# Patient Record
Sex: MALE | Race: WHITE | Employment: FULL TIME | URBAN - METROPOLITAN AREA
[De-identification: names, ages, dates, MRNs, and addresses within clinical notes are randomized per-mention and may not be internally consistent; named-entity substitution may affect disease eponyms.]

---

## 2018-03-29 ENCOUNTER — HOSPITAL ENCOUNTER (OUTPATIENT)
Dept: LAB | Facility: CLINIC | Age: 38
Discharge: HOME | End: 2018-03-29
Attending: INTERNAL MEDICINE | Admitting: INTERNAL MEDICINE
Payer: COMMERCIAL

## 2018-03-29 ENCOUNTER — OFFICE VISIT (OUTPATIENT)
Dept: INTERNAL MEDICINE | Facility: CLINIC | Age: 38
End: 2018-03-29
Payer: COMMERCIAL

## 2018-03-29 VITALS
DIASTOLIC BLOOD PRESSURE: 68 MMHG | HEART RATE: 64 BPM | WEIGHT: 155.4 LBS | SYSTOLIC BLOOD PRESSURE: 108 MMHG | TEMPERATURE: 98.5 F | HEIGHT: 68 IN | BODY MASS INDEX: 23.55 KG/M2 | RESPIRATION RATE: 16 BRPM | OXYGEN SATURATION: 96 %

## 2018-03-29 DIAGNOSIS — Z00.00 ROUTINE GENERAL MEDICAL EXAMINATION AT A HEALTH CARE FACILITY: Primary | ICD-10-CM

## 2018-03-29 DIAGNOSIS — H61.21 IMPACTED CERUMEN OF RIGHT EAR: ICD-10-CM

## 2018-03-29 DIAGNOSIS — H93.8X3 SENSATION OF FULLNESS IN BOTH EARS: ICD-10-CM

## 2018-03-29 DIAGNOSIS — Z00.00 ROUTINE GENERAL MEDICAL EXAMINATION AT A HEALTH CARE FACILITY: ICD-10-CM

## 2018-03-29 DIAGNOSIS — Z23 NEED FOR DIPHTHERIA-TETANUS-PERTUSSIS (TDAP) VACCINE: ICD-10-CM

## 2018-03-29 DIAGNOSIS — Z71.89 ADVANCE DIRECTIVE DISCUSSED WITH PATIENT: ICD-10-CM

## 2018-03-29 LAB
ALBUMIN SERPL-MCNC: 3.8 G/DL (ref 3.4–5)
ALBUMIN SERPL-MCNC: 3.8 G/DL (ref 3.4–5)
ALP SERPL-CCNC: 63 U/L (ref 40–150)
ALP SERPL-CCNC: 63 U/L (ref 40–150)
ALT SERPL W P-5'-P-CCNC: 28 U/L (ref 0–70)
ALT SERPL W P-5'-P-CCNC: 28 U/L (ref 0–70)
ANION GAP SERPL CALCULATED.3IONS-SCNC: 5 MMOL/L (ref 3–14)
ANION GAP SERPL CALCULATED.3IONS-SCNC: 5 MMOL/L (ref 6–17)
AST SERPL W P-5'-P-CCNC: 17 U/L (ref 0–45)
AST SERPL W P-5'-P-CCNC: 17 U/L (ref 0–45)
BILIRUB SERPL-MCNC: 0.3 MG/DL (ref 0.2–1.3)
BILIRUB SERPL-MCNC: 0.3 MG/DL (ref 0.2–1.3)
BUN SERPL-MCNC: 11 MG/DL (ref 7–30)
BUN SERPL-MCNC: 11 MG/DL (ref 7–30)
CALCIUM SERPL-MCNC: 9.2 MG/DL (ref 8.5–10.1)
CALCIUM SERPL-MCNC: 9.2 MG/DL (ref 8.5–10.1)
CHLORIDE SERPL-SCNC: 103 MMOL/L (ref 94–109)
CHLORIDE SERPL-SCNC: 103 MMOL/L (ref 94–109)
CHOLEST SERPL-MCNC: 134 MG/DL
CHOLEST SERPL-MCNC: 134 MG/DL
CO2 SERPL-SCNC: 31 MMOL/L (ref 20–32)
CO2 SERPL-SCNC: 31 MMOL/L (ref 20–32)
CREAT SERPL-MCNC: 0.98 MG/DL (ref 0.66–1.25)
CREAT SERPL-MCNC: 0.98 MG/DL (ref 0.66–1.25)
ERYTHROCYTE [DISTWIDTH] IN BLOOD BY AUTOMATED COUNT: 11.6 % (ref 10–15)
ERYTHROCYTE [DISTWIDTH] IN BLOOD BY AUTOMATED COUNT: 11.6 % (ref 10–15)
GFR SERPL CREATININE-BSD FRML MDRD: 87 ML/MIN/1.7M2
GFR SERPL CREATININE-BSD FRML MDRD: 87 ML/MIN/1.7M2
GLUCOSE SERPL-MCNC: 91 MG/DL (ref 70–99)
GLUCOSE SERPL-MCNC: 91 MG/DL (ref 70–99)
HCT VFR BLD AUTO: 42.7 % (ref 40–53)
HCT VFR BLD AUTO: 42.7 % (ref 40–53)
HDLC SERPL-MCNC: 53 MG/DL
HDLC SERPL-MCNC: 53 MG/DL
HGB BLD-MCNC: 14.7 G/DL (ref 13.3–17.7)
HGB BLD-MCNC: 14.7 G/DL (ref 13.3–17.7)
LDLC SERPL CALC-MCNC: 71 MG/DL
LDLC SERPL CALC-MCNC: 71 MG/DL (ref 0–130)
MCH RBC QN AUTO: 29.9 PG (ref 26.5–33)
MCH RBC QN AUTO: 29.9 PG (ref 26.5–33)
MCHC RBC AUTO-ENTMCNC: 34.4 G/DL (ref 31.5–36.5)
MCHC RBC AUTO-ENTMCNC: 34.4 G/DL (ref 31.5–36.5)
MCV RBC AUTO: 87 FL (ref 78–100)
MCV RBC AUTO: 87 FL (ref 78–100)
NONHDLC SERPL-MCNC: 81 MG/DL
NONHDLC SERPL-MCNC: 81 MG/DL
PLATELET # BLD AUTO: 260 10E9/L (ref 150–450)
PLATELET # BLD AUTO: 260 10E9/L (ref 150–450)
POTASSIUM SERPL-SCNC: 4 MMOL/L (ref 3.4–5.3)
POTASSIUM SERPL-SCNC: 4 MMOL/L (ref 3.4–5.3)
PROT SERPL-MCNC: 7.8 G/DL (ref 6.8–8.8)
PROT SERPL-MCNC: 7.8 G/DL (ref 6.8–8.8)
RBC # BLD AUTO: 4.92 10E12/L (ref 4.4–5.9)
RBC # BLD AUTO: 4.92 10E12/L (ref 4.4–5.9)
SODIUM SERPL-SCNC: 139 MMOL/L (ref 133–144)
SODIUM SERPL-SCNC: 139 MMOL/L (ref 133–144)
TRIGL SERPL-MCNC: 51 MG/DL
TRIGL SERPL-MCNC: 51 MG/DL
TSH SERPL DL<=0.005 MIU/L-ACNC: 2.23 MU/L (ref 0.4–4)
TSH SERPL DL<=0.005 MIU/L-ACNC: 2.23 MU/L (ref 0.4–4)
WBC # BLD AUTO: 8.7 10E9/L (ref 4–11)
WBC # BLD AUTO: 8.7 10E9/L (ref 4–11)

## 2018-03-29 PROCEDURE — 84443 ASSAY THYROID STIM HORMONE: CPT | Performed by: INTERNAL MEDICINE

## 2018-03-29 PROCEDURE — 69210 REMOVE IMPACTED EAR WAX UNI: CPT | Mod: RT | Performed by: INTERNAL MEDICINE

## 2018-03-29 PROCEDURE — 80061 LIPID PANEL: CPT | Performed by: INTERNAL MEDICINE

## 2018-03-29 PROCEDURE — 80053 COMPREHEN METABOLIC PANEL: CPT | Performed by: INTERNAL MEDICINE

## 2018-03-29 PROCEDURE — 85027 COMPLETE CBC AUTOMATED: CPT | Performed by: INTERNAL MEDICINE

## 2018-03-29 PROCEDURE — 99385 PREV VISIT NEW AGE 18-39: CPT | Mod: 25 | Performed by: INTERNAL MEDICINE

## 2018-03-29 PROCEDURE — 90471 IMMUNIZATION ADMIN: CPT | Performed by: INTERNAL MEDICINE

## 2018-03-29 PROCEDURE — 90715 TDAP VACCINE 7 YRS/> IM: CPT | Performed by: INTERNAL MEDICINE

## 2018-03-29 PROCEDURE — 36415 COLL VENOUS BLD VENIPUNCTURE: CPT | Performed by: INTERNAL MEDICINE

## 2018-03-29 NOTE — PROGRESS NOTES
Dr Gaffney's note    Patient's instructions / PLAN:                                                        Plan:  1. Labs today   2. Flonase nasal spray daily - if still plugged ears  3. ENT referral if not better        ASSESSMENT & PLAN:                                                      (Z00.00) Routine general medical examination at a health care facility  (primary encounter diagnosis)  Comment:   Plan: Comprehensive metabolic panel, Lipid panel         reflex to direct LDL Fasting, TSH with free T4         reflex, CBC with platelets            (H61.21) Impacted cerumen of right ear  Comment:   Plan: as above     (H93.8X3) Sensation of fullness in both ears  Comment:   Plan: OTOLARYNGOLOGY REFERRAL            (Z71.89) Advance directive discussed with patient  Comment:   Plan: forms given    (Z23) Need for diphtheria-tetanus-pertussis (Tdap) vaccine  Comment:   Plan: TDAP VACCINE (ADACEL)               Chief Complaint:                                                      Annual exam    SUBJECTIVE:                                                    History of present illness     Both ears: when he works out or moving he feels he has water in both ears. More the R ear. This is been going on on/off for months     R ear impacted wax no wash    Exam The auditory external canal on the R has impacted wax.   The patient gave me verbal consent and I remove it allt with a curet.  No complications         ROS:   General: Negative for fever, chills, major weight changes, fatigue  Skin: Negative for rashes, abnormal spots  Eyes: Negative for blurred or double vision  ENT/mouth: Negative for sinuses discomfort, sore throat. Pos for ear symptoms, as above   Respiratory: Negative for cough, wheezes, chronic lung disease  Cardiovascular: Negative for rest or exertional chest pain, shortness of breath, palpitations, leg edema,   Gastrointestinal: Negative for vomiting, abdominal pain, heartburn, blood in stool, diarrhea,  "constipation  Genitourinary: Negative for urinary frequency, blood in urine, history of kidney stones  Male: Negative for difficulty urinating  Neuro: Negative for headaches, numbness, tingling, weakness in arms or legs, history of seizure, recent syncope  Psychiatry: Negative for depression, anxiety, suicidal thoughts  Endo: Negative for known thyroid disease, diabetes.  Hemato/Lymph: Negative for nodes, easy bleeding, history of DVT, blood transfusion  Musculoskeletal: Negative for joint swelling, back pain      PMHx: - reviewed  History reviewed. No pertinent past medical history.      PSHx: reviewed  History reviewed. No pertinent surgical history.     Soc Hx: No daily alcohol, no smoking  Social History     Social History     Marital status:      Spouse name: N/A     Number of children: N/A     Years of education: N/A     Occupational History     Not on file.     Social History Main Topics     Smoking status: Never Smoker     Smokeless tobacco: Never Used     Alcohol use Yes     Drug use: No     Sexual activity: Yes     Partners: Female     Other Topics Concern     Not on file     Social History Narrative     No narrative on file        Fam Hx: reviewed  Family History   Problem Relation Age of Onset     No Known Problems Mother      No Known Problems Father      Other Cancer Maternal Grandmother      Brain Cancer Paternal Grandmother      Lung Cancer Paternal Grandfather 87     NON-SMOKER     DIABETES No family hx of          Screening: reviewed    All: reviewed    Meds: reviewed  No current outpatient prescriptions on file.           OBJECTIVE:                                                    Physical Exam :      Blood pressure 108/68, pulse 64, temperature 98.5  F (36.9  C), temperature source Oral, resp. rate 16, height 5' 7.5\" (1.715 m), weight 155 lb 6.4 oz (70.5 kg), SpO2 96 %.   NAD, appears comfortable  Skin clear, no rashes  HEENT: PERRLA, EOMI, anicteric sclera, pink conjunctiva, external " ears appear normal, bilateral tympanic membranes clinically normal, oropharynx normal color.  Neck: supple, no JVD, no Carotid bruits, no thyroidmegaly  Lymph nodes non palpable in the cervical, supraclavicular axillaries, inguinal areas  Chest: clear to auscultation with good respiratory effort  Cardiac: S1S2, RRR, no mgr appreciated  Abdomen: soft, not tender, not distended, audible bowel sound, no hepatosplenomegaly, no palpable masses, no abdominal bruits  Extremities: no cyanosis, clubbing or edema.   Neuro: A, Ox3, no focal signs.  Breast exam no gynecomastia, no masses      Laurence Gaffney MD  Internal Medicine     SUBJECTIVE:   CC: Todd Verma is an 38 year old male who presents for preventative health visit.     Fasting.    Physical   Annual:     Getting at least 3 servings of Calcium per day::  Yes    Bi-annual eye exam::  NO    Dental care twice a year::  Yes    Sleep apnea or symptoms of sleep apnea::  None    Diet::  Regular (no restrictions)    Frequency of exercise::  2-3 days/week    Duration of exercise::  45-60 minutes    Taking medications regularly::  Yes    Medication side effects::  None    Additional concerns today::  YES          Ear Problem         Today's PHQ-2 Score:   PHQ-2 ( 1999 Pfizer) 3/29/2018   Q1: Little interest or pleasure in doing things 0   Q2: Feeling down, depressed or hopeless 0   PHQ-2 Score 0   Q1: Little interest or pleasure in doing things Not at all   Q2: Feeling down, depressed or hopeless Not at all   PHQ-2 Score 0       Abuse: Current or Past(Physical, Sexual or Emotional)- No  Do you feel safe in your environment - Yes    Social History   Substance Use Topics     Smoking status: Never Smoker     Smokeless tobacco: Never Used     Alcohol use Yes     Alcohol Use 3/29/2018   If you drink alcohol do you typically have greater than 3 drinks per day OR greater than 7 drinks per week? No   No flowsheet data found.    Last PSA: No results found for: PSA    Reviewed  "orders with patient. Reviewed health maintenance and updated orders accordingly - Yes      Reviewed and updated as needed this visit by clinical staff  Tobacco  Allergies  Meds  Med Hx  Surg Hx  Fam Hx  Soc Hx        Reviewed and updated as needed this visit by Provider            Review of Systems   HENT: Positive for ear pain.      Physical Exam        COUNSELING:   Reviewed preventive health counseling, as reflected in patient instructions       Regular exercise       Healthy diet/nutrition         reports that he has never smoked. He has never used smokeless tobacco.    Estimated body mass index is 23.98 kg/(m^2) as calculated from the following:    Height as of this encounter: 5' 7.5\" (1.715 m).    Weight as of this encounter: 155 lb 6.4 oz (70.5 kg).       Counseling Resources:  ATP IV Guidelines  Pooled Cohorts Equation Calculator  FRAX Risk Assessment  ICSI Preventive Guidelines  Dietary Guidelines for Americans, 2010  USDA's MyPlate  ASA Prophylaxis  Lung CA Screening    Laurence Braswell MD  [facility]  Answers for HPI/ROS submitted by the patient on 3/29/2018   PHQ-2 Score: 0    "

## 2018-03-29 NOTE — MR AVS SNAPSHOT
"              After Visit Summary   3/29/2018    Todd Verma    MRN: [id number]           Patient Information     Date Of Birth          1980        Visit Information        Provider Department      3/29/2018 8:00 AM Laurence Braswell MD [facility] [address] [facility]        Today's Diagnoses     Routine general medical examination at a health care facility    -  1    Impacted cerumen of right ear        Sensation of fullness in both ears          Care Instructions    Plan:  1. Labs today   2. Flonase nasal spray daily - if still plugged ears  3. ENT referral if not better    I recommend to consider attending the free classes regarding Health Directives. They are offered by [facility] at different locations.   Contact Information: [facility] [place]    Phone: [phone], Fax: [phone]  [email or web address]             HEALTH INSURANCE AND YOUR OUT-OF-POCKET COSTS    How is the physical visit different from an office visit ?    A physical visit is a routine check-up or yearly physical exam. This is sometimes called \"preventive care\".  ( for example, you might have a clinic exam every year or a mammogram every other year). These visits are meant to prevent health problems. They do not include tests or treatments for specific medical issues.     An office visit is a clinic visit to check on a symptoms or to treat a specific concern. This concern may be new or ongoing. Your provider (care team) might order tests or prescribe treatments.     Can I have these services at the same time ?    Yes. If you come in for a physical exam, your provider will want to  talk about any symptoms you are having. This way, we may catch small problems before they become more serious - and you won't have to make another trip to the clinic.     If there is not enough time to talk about your symptoms, your provider will ask you to come back.    If you are treated for a medical issue during a physical exam, we must " "bill your plan for both services. This is a rule set by insurance companies.     If I receive both services, what are my out-of-pocket costs ?    Some plans will pay for both services. Others ( like Medicare) will not. You will need to pay for any service that your plan won't cover.     Even if your plan covers both services, you may still have out-of-pocket costs. Examples:    -- your plan may offer free physical exams. But you may owe a co-pay and other fees for services received as part of an office visit.   -- you plan may require two co-pays. If you have concerns about the second co-pay, please contact your insurance plan.    To find out what your total costs will be, you will need to call your insurance plan. Ask:    -- what does my plan cover ? Find out if your physical visit was covered. What if you also had testing or treatment for a medical concern ?    -- how much do I need to pay ? Ask about co-pays, co-insurance, your deductible and any other out-of-pocket costs.     -- are there limits on what my plan will pay for ? There may be limits on office visits, physical exams and routine tests ( such mammograms, PSA tests and colonoscopies).    About Your Out-Of-Pocket Costs    Out-of-Pocket costs are charges that are not covered by your insurance plan. You will need to pay for them yourself. They may include:    -- Services that your plan will not pay for. Please call your insurance plan to find out what it will cover.     -- A deductible. This is a fixed amount that you pay each year before insurance will pay for services. When you have paid the full amount, then you have \"met\" your deductible. After that, your plan will pay for part or all of your care.     -- Co-pays (co-payments). A co-pay is the amount you must pay at the time of service. It is a flat fee, decided by your insurance plan. Your fee may be different for wellness visits and office visits. Your plan will not cover this fee. The fee will not " count toward your deductible.    -- Co-insurance. You may need to pay a percent of the costs for all services you receive. This is called co-insurance. After your clinic visit, your plan will bill you for your share of the cost. This amount may count toward your deductible.     Copyright @ [facility]. All rights reserved. Jeremie 69958 - REV 11/12  -------------              Follow-ups after your visit        Additional Services     OTOLARYNGOLOGY REFERRAL       Your provider has referred you to:     N: [facility] [place] [phone]   [email or web address]    N: [facility] [phone]   [email or web address]  N: [facility] [phone]   [email or web address]  N: [place] Katelyn Fallon  [facility]  Katelyn Fallon [phone]   [email or web address]  N: [facility] [phone]   [email or web address]  N: [place] [facility] [phone]   [email or web address]  N: [facility] [phone]   [email or web address]    Please be aware that coverage of these services is subject to the terms and limitations of your health insurance plan.  Call member services at your health plan with any benefit or coverage questions.      Please bring the following to your appointment:  >>   Any x-rays, CTs or MRIs which have been performed.  Contact the facility where they were done to arrange for  prior to your scheduled appointment.  Any new CT, MRI or other procedures ordered by your specialist must be performed at a [facility] or coordinated by your clinic's referral office.    >>   List of current medications   >>   This referral request   >>   Any documents/labs given to you for this  "referral                  Who to contact     If you have questions or need follow up information about today's clinic visit or your schedule please contact [facility] directly at [phone].  Normal or non-critical lab and imaging results will be communicated to you by MyChart, letter or phone within 4 business days after the clinic has received the results. If you do not hear from us within 7 days, please contact the clinic through MyChart or phone. If you have a critical or abnormal lab result, we will notify you by phone as soon as possible.  Submit refill requests through [employer] or call your pharmacy and they will forward the refill request to us. Please allow 3 business days for your refill to be completed.          Additional Information About Your Visit        Care EveryWhere ID     This is your Care EveryWhere ID. This could be used by other organizations to access your [place] medical records  [id number]        Your Vitals Were     Pulse Temperature Respirations Height Pulse Oximetry BMI (Body Mass Index)    64 98.5  F (36.9  C) (Oral) 16 5' 7.5\" (1.715 m) 96% 23.98 kg/m2       Blood Pressure from Last 3 Encounters:   03/29/18 108/68    Weight from Last 3 Encounters:   03/29/18 155 lb 6.4 oz (70.5 kg)              We Performed the Following     CBC with platelets     Comprehensive metabolic panel     Lipid panel reflex to direct LDL Fasting     OTOLARYNGOLOGY REFERRAL     TSH with free T4 reflex        Primary Care Provider Office Phone # Fax #    Laurence Braswell [phone]       [address] [facility] [address]        Equal Access to Services     [facility] LADONNA : Hadii aad ku hadasho Soomaali, waaxda luqadaha, qaybta kaalmada adeegyateena, royce bonner. So [facility] [phone].    ATENCIÓN: Si habla español, tiene a hitchcock disposición servicios gratuitos de asistencia lingüística. Llame al [phone].    We comply with " applicable federal civil rights laws and [place] laws. We do not discriminate on the basis of race, color, national origin, age, disability, sex, sexual orientation, or gender identity.            Thank you!     Thank you for choosing [facility]  for your care. Our goal is always to provide you with excellent care. Hearing back from our patients is one way we can continue to improve our services. Please take a few minutes to complete the written survey that you may receive in the mail after your visit with us. Thank you!             Your Updated Medication List - Protect others around you: Learn how to safely use, store and throw away your medicines at www.disposemymeds.org.      Notice  As of 3/29/2018  8:30 AM    You have not been prescribed any medications.

## 2018-03-29 NOTE — NURSING NOTE
"Chief Complaint   Patient presents with     Establish Care     Physical     Ear Problem       Initial /68 (BP Location: Right arm, Patient Position: Chair, Cuff Size: Adult Large)  Pulse 64  Temp 98.5  F (36.9  C) (Oral)  Resp 16  Ht 5' 7.5\" (1.715 m)  Wt 155 lb 6.4 oz (70.5 kg)  SpO2 96%  BMI 23.98 kg/m2 Estimated body mass index is 23.98 kg/(m^2) as calculated from the following:    Height as of this encounter: 5' 7.5\" (1.715 m).    Weight as of this encounter: 155 lb 6.4 oz (70.5 kg).  Medication Reconciliation: complete    "

## 2018-03-29 NOTE — NURSING NOTE
Screening Questionnaire for Adult Immunization    Are you sick today?   No   Do you have allergies to medications, food, a vaccine component or latex?   No   Have you ever had a serious reaction after receiving a vaccination?   No   Do you have a long-term health problem with heart disease, lung disease, asthma, kidney disease, metabolic disease (e.g. diabetes), anemia, or other blood disorder?   No   Do you have cancer, leukemia, HIV/AIDS, or any other immune system problem?   No   In the past 3 months, have you taken medications that affect  your immune system, such as prednisone, other steroids, or anticancer drugs; drugs for the treatment of rheumatoid arthritis, Crohn s disease, or psoriasis; or have you had radiation treatments?   No   Have you had a seizure, or a brain or other nervous system problem?   No   During the past year, have you received a transfusion of blood or blood     products, or been given immune (gamma) globulin or antiviral drug?   No   For women: Are you pregnant or is there a chance you could become        pregnant during the next month?   No   Have you received any vaccinations in the past 4 weeks?   No     Immunization questionnaire answers were all negative.        Per orders of Dr. Gaffney, injection of Tdap given by Jennifer Salmon. Patient instructed to remain in clinic for 15 minutes afterwards, and to report any adverse reaction to me immediately.       Screening performed by Jennifer Salmon on 3/29/2018 at 8:41 AM.

## 2018-03-29 NOTE — LETTER
[facility]  [address], [address]  [place] [address]  [phone]        March 29, 2018    Todd Verma  [address] [place] [address]            Dear Mr. Todd Verma:      The recent blood tests results are in acceptable limits.    Sincerely,    Laurence Gaffney MD  Internal Medicine    These are some general explanations for tests  WBC means White Blood Cells  Platelets are small blood cells that help with forming the blood clots along with other blood factors.  Electrolytes are Sodium, Potassium, Calcium, Magnesium, Phosphorus.  Liver tests are: AST, ALT, Bilirubin, Alkaline Phosphatase.  Kidney tests are Creatinine, GFR.  HDL Cholesterol - is the good cholesterol and it is good to have it high.  LDL cholesterol is the bad cholesterol and it is good to have it low.  It is recommended to have LDL less than 130 for people with hypertension and to have it less than 100 for people with heart disease, diabetes and chronic kidney disease.  Thyroid tests are TSH, T4, T3  A1c is a test that gives us an idea about how well was controlled the diabetes for the last 3 months.

## 2020-11-03 ENCOUNTER — OFFICE VISIT (OUTPATIENT)
Dept: INTERNAL MEDICINE | Facility: CLINIC | Age: 40
End: 2020-11-03
Payer: COMMERCIAL

## 2020-11-03 VITALS
HEART RATE: 62 BPM | RESPIRATION RATE: 16 BRPM | HEIGHT: 68 IN | SYSTOLIC BLOOD PRESSURE: 98 MMHG | WEIGHT: 164 LBS | TEMPERATURE: 98.4 F | BODY MASS INDEX: 24.86 KG/M2 | DIASTOLIC BLOOD PRESSURE: 60 MMHG | OXYGEN SATURATION: 99 %

## 2020-11-03 DIAGNOSIS — H92.01 RIGHT EAR PAIN: Primary | ICD-10-CM

## 2020-11-03 DIAGNOSIS — H65.01 RIGHT ACUTE SEROUS OTITIS MEDIA, RECURRENCE NOT SPECIFIED: ICD-10-CM

## 2020-11-03 PROCEDURE — 99213 OFFICE O/P EST LOW 20 MIN: CPT | Performed by: NURSE PRACTITIONER

## 2020-11-03 RX ORDER — CEFDINIR 300 MG/1
300 CAPSULE ORAL 2 TIMES DAILY
Qty: 20 CAPSULE | Refills: 0 | Status: SHIPPED | OUTPATIENT
Start: 2020-11-03 | End: 2020-11-20

## 2020-11-03 ASSESSMENT — MIFFLIN-ST. JEOR: SCORE: 1630.46

## 2020-11-03 NOTE — NURSING NOTE
"Chief Complaint   Patient presents with     Plugged Ears     rt ear is plugged had same thing x 2 years ago.     initial BP 98/60   Pulse 62   Temp 98.4  F (36.9  C) (Oral)   Resp 16   Ht 1.715 m (5' 7.5\")   Wt 74.4 kg (164 lb)   SpO2 99%   BMI 25.31 kg/m   Estimated body mass index is 25.31 kg/m  as calculated from the following:    Height as of this encounter: 1.715 m (5' 7.5\").    Weight as of this encounter: 74.4 kg (164 lb)..  bp completed using cuff size regular  JULI FERNÁNDEZ LPN  "

## 2020-11-03 NOTE — PROGRESS NOTES
"Subjective     Todd Verma is a 40 year old male who presents to clinic today for the following health issues:    HPI       Chief Complaint   Patient presents with     Plugged Ears     rt ear is plugged had same thing x 2 years ago.              Review of Systems   Constitutional, HEENT, cardiovascular, pulmonary, GI, , musculoskeletal, neuro, skin, endocrine and psych systems are negative, except as otherwise noted.      Objective    BP 98/60   Pulse 62   Temp 98.4  F (36.9  C) (Oral)   Resp 16   Ht 1.715 m (5' 7.5\")   Wt 74.4 kg (164 lb)   SpO2 99%   BMI 25.31 kg/m    Body mass index is 25.31 kg/m .  Physical Exam   GENERAL: alert and no distress  HENT: ear canals and TM's normal left right TM with area of pink - no cerumen present , nose and mouth without ulcers or lesions  RESP: lungs clear to auscultation - no rales, rhonchi or wheezes  CV: regular rate and rhythm  PSYCH: mentation appears normal, affect normal/bright    Plans on physical in a week or so         Assessment & Plan     Right ear pain    - cefdinir (OMNICEF) 300 MG capsule; Take 1 capsule (300 mg) by mouth 2 times daily    Right acute serous otitis media, recurrence not specified    - cefdinir (OMNICEF) 300 MG capsule; Take 1 capsule (300 mg) by mouth 2 times daily     BMI:   Estimated body mass index is 25.31 kg/m  as calculated from the following:    Height as of this encounter: 1.715 m (5' 7.5\").    Weight as of this encounter: 74.4 kg (164 lb).            Patient Instructions   OMNICEF TWICE DAILY FOR 10 DAYS     MAY TAKE CLARITIN DAILY        Return in about 9 days (around 11/12/2020) for Routine preventive.    GAY Murray [facility]    "

## 2020-11-20 ENCOUNTER — OFFICE VISIT (OUTPATIENT)
Dept: INTERNAL MEDICINE | Facility: CLINIC | Age: 40
End: 2020-11-20
Payer: COMMERCIAL

## 2020-11-20 VITALS
RESPIRATION RATE: 16 BRPM | BODY MASS INDEX: 24.86 KG/M2 | DIASTOLIC BLOOD PRESSURE: 71 MMHG | HEART RATE: 53 BPM | HEIGHT: 68 IN | SYSTOLIC BLOOD PRESSURE: 115 MMHG | WEIGHT: 164 LBS

## 2020-11-20 DIAGNOSIS — Z00.00 ROUTINE GENERAL MEDICAL EXAMINATION AT A HEALTH CARE FACILITY: Primary | ICD-10-CM

## 2020-11-20 PROCEDURE — 99395 PREV VISIT EST AGE 18-39: CPT | Performed by: NURSE PRACTITIONER

## 2020-11-20 ASSESSMENT — ENCOUNTER SYMPTOMS
DIARRHEA: 0
NERVOUS/ANXIOUS: 0
HEMATOCHEZIA: 0
WEAKNESS: 0
CHILLS: 0
SHORTNESS OF BREATH: 0
CONSTIPATION: 0
ABDOMINAL PAIN: 0
HEARTBURN: 0
FREQUENCY: 0
PARESTHESIAS: 0
NAUSEA: 0
PALPITATIONS: 0
HEADACHES: 0
FEVER: 0
DYSURIA: 0
MYALGIAS: 0
ARTHRALGIAS: 0
HEMATURIA: 0
EYE PAIN: 0
JOINT SWELLING: 0
SORE THROAT: 0
DIZZINESS: 0
COUGH: 0

## 2020-11-20 ASSESSMENT — MIFFLIN-ST. JEOR: SCORE: 1630.46

## 2020-11-20 NOTE — PROGRESS NOTES
SUBJECTIVE:   CC: Todd Verma is an 40 year old male who presents for preventative health visit.       Patient has been advised of split billing requirements and indicates understanding: Yes  Healthy Habits:     Getting at least 3 servings of Calcium per day:  Yes    Bi-annual eye exam:  Yes    Dental care twice a year:  Yes    Sleep apnea or symptoms of sleep apnea:  None    Diet:  Regular (no restrictions)    Frequency of exercise:  4-5 days/week    Duration of exercise:  15-30 minutes    Taking medications regularly:  Yes    Medication side effects:  None    PHQ-2 Total Score: 0    Additional concerns today:  No    Ability to successfully perform activities of daily living: Yes, no assistance needed  Home safety:  none identified   Hearing impairment:No        Today's PHQ-2 Score:   PHQ-2 ( 1999 Pfizer) 11/20/2020   Q1: Little interest or pleasure in doing things 0   Q2: Feeling down, depressed or hopeless 0   PHQ-2 Score 0   Q1: Little interest or pleasure in doing things Not at all   Q2: Feeling down, depressed or hopeless Not at all   PHQ-2 Score 0       Abuse: Current or Past(Physical, Sexual or Emotional)- No  Do you feel safe in your environment? Yes        Social History     Tobacco Use     Smoking status: Never Smoker     Smokeless tobacco: Never Used   Substance Use Topics     Alcohol use: Yes       Alcohol Use 11/20/2020   Prescreen: >3 drinks/day or >7 drinks/week? No   Prescreen: >3 drinks/day or >7 drinks/week? -       Last PSA: No results found for: PSA    Reviewed orders with patient. Reviewed health maintenance and updated orders accordingly -       Reviewed and updated as needed this visit by clinical staff  Tobacco  Allergies  Meds  Problems  Med Hx  Surg Hx  Fam Hx          Reviewed and updated as needed this visit by Provider  Tobacco  Allergies  Meds  Problems  Med Hx  Surg Hx  Fam Hx             Review of Systems   Constitutional: Negative for chills and fever.  "  HENT: Negative for congestion, ear pain, hearing loss and sore throat.    Eyes: Negative for pain and visual disturbance.   Respiratory: Negative for cough and shortness of breath.    Cardiovascular: Negative for chest pain, palpitations and peripheral edema.   Gastrointestinal: Negative for abdominal pain, constipation, diarrhea, heartburn, hematochezia and nausea.   Genitourinary: Negative for discharge, dysuria, frequency, genital sores, hematuria, impotence and urgency.   Musculoskeletal: Negative for arthralgias, joint swelling and myalgias.   Skin: Negative for rash.   Neurological: Negative for dizziness, weakness, headaches and paresthesias.   Psychiatric/Behavioral: Negative for mood changes. The patient is not nervous/anxious.      Right ear pain - treated and improved      OBJECTIVE:   /71   Pulse 53   Resp 16   Ht 1.715 m (5' 7.5\")   Wt 74.4 kg (164 lb)   BMI 25.31 kg/m      Physical Exam  GENERAL: healthy, alert and no distress  EYES: Eyes grossly normal to inspection, PERRL and conjunctivae and sclerae normal  HENT: ear canals and TM's normal, nose and mouth without ulcers or lesions  NECK: no adenopathy, no asymmetry, masses, or scars and thyroid normal to palpation  RESP: lungs clear to auscultation - no rales, rhonchi or wheezes  CV: regular rate and rhythm, normal S1 S2, no S3 or S4, no murmur, click or rub, no peripheral edema and peripheral pulses strong  ABDOMEN: soft, nontender, no hepatosplenomegaly, no masses and bowel sounds normal   (male): normal male genitalia without lesions or urethral discharge, no hernia  MS: no gross musculoskeletal defects noted, no edema  SKIN: no suspicious lesions or rashes  NEURO: Normal strength and tone, mentation intact and speech normal  PSYCH: mentation appears normal, affect normal/bright    Diagnostic Test Results:  Labs reviewed in Epic  Declined to do this year     ASSESSMENT/PLAN:   1. Routine general medical examination at a [facility]" "facility        Patient has been advised of split billing requirements and indicates understanding:   COUNSELING:   Reviewed preventive health counseling, as reflected in patient instructions       Regular exercise       Healthy diet/nutrition       Prostate cancer screening       Osteoporosis prevention/bone health    Estimated body mass index is 25.31 kg/m  as calculated from the following:    Height as of this encounter: 1.715 m (5' 7.5\").    Weight as of this encounter: 74.4 kg (164 lb).         He reports that he has never smoked. He has never used smokeless tobacco.      Counseling Resources:  ATP IV Guidelines  Pooled Cohorts Equation Calculator  FRAX Risk Assessment  ICSI Preventive Guidelines  Dietary Guidelines for Americans, 2010  USDA's MyPlate  ASA Prophylaxis  Lung CA Screening    GAY [facility]  "

## 2022-06-21 NOTE — PROGRESS NOTES
Dr Gaffney's note    Patient's instructions / PLAN:                                                        Plan:  1. Please make a lab appointment for fasting labs    2. Tetanus shot today       ASSESSMENT & PLAN:                                                      (Z00.00) Routine general medical examination at a health care facility  (primary encounter diagnosis)  Comment:   Plan: CBC with platelets, Comprehensive metabolic         panel, Lipid panel reflex to direct LDL         Fasting, TSH with free T4 reflex, ESR:         Erythrocyte sedimentation rate, CRP,         inflammation            (H57.89) Eye inflammation  Comment:   Plan: CBC with platelets, Comprehensive metabolic         panel, Lipid panel reflex to direct LDL         Fasting, TSH with free T4 reflex, ESR:         Erythrocyte sedimentation rate, CRP,         inflammation            (Z23) Need for vaccination  Comment:   Plan: TDAP VACCINE (Adacel, Boostrix)  [[id number]]               Chief Complaint:                                                      Annual exam  Follow up chronic medical problems      SUBJECTIVE:                                                    History of present illness     We reviewed the chronic medical problems as above.   I reviewed the recent tests results in Epic     L eye inflamation   -- he sees eye doctor  -- he was prescribed prednisolone eye drops  -- he doesn't know the Dx. Note are not available in epic.     ROS:     See below      PMHx: - reviewed  History reviewed. No pertinent past medical history.      PSHx: reviewed  History reviewed. No pertinent surgical history.     Soc Hx: No daily alcohol, no smoking  Social History     Socioeconomic History     Marital status:      Spouse name: Not on file     Number of children: Not on file     Years of education: Not on file     Highest education level: Not on file   Occupational History     Not on file   Tobacco Use     Smoking status: Never Smoker      "Smokeless tobacco: Never Used   Vaping Use     Vaping Use: Never used   Substance and Sexual Activity     Alcohol use: Yes     Drug use: No     Sexual activity: Yes     Partners: Female   Other Topics Concern     Parent/sibling w/ CABG, MI or angioplasty before 65F 55M? Not Asked   Social History Narrative     Not on file     Social Determinants of Health     Financial Resource Strain: Not on file   Food Insecurity: Not on file   Transportation Needs: Not on file   Physical Activity: Not on file   Stress: Not on file   Social Connections: Not on file   Intimate Partner Violence: Not on file   Housing Stability: Not on file        Fam Hx: reviewed  Family History   Problem Relation Age of Onset     No Known Problems Mother      Hypertension Father      Other Cancer Maternal Grandmother      Brain Cancer Paternal Grandmother      Lung Cancer Paternal Grandfather 87        NON-SMOKER     Diabetes No family hx of          Screening: reviewed    All: reviewed    Meds: reviewed  Current Outpatient Medications   Medication Sig Dispense Refill     gentamicin-prednisoLONE (PRED-G) 0.3-1 % ophthalmic suspension 1 drop 4 times daily             OBJECTIVE:                                                    Physical Exam :    Blood pressure 100/60, pulse 67, temperature 98.4  F (36.9  C), temperature source Tympanic, resp. rate 18, height 1.715 m (5' 7.5\"), weight 74.3 kg (163 lb 11.2 oz), SpO2 95 %.     NAD, appears comfortable  Skin clear, no rashes  HEENT: PERRLA, EOMI, left eye erythematous conjunctiva  Neck: supple, no JVD,  no thyroidmegaly  Lymph nodes non palpable in the cervical, supraclavicular axillaries,   Chest: clear to auscultation with good respiratory effort  Cardiac: S1S2, RRR, no mgr appreciated  Abdomen: soft, not tender, not distended, audible bowel sound, no hepatosplenomegaly, no palpable masses, no abdominal bruits  Extremities: no cyanosis, clubbing or edema.   Neuro: A, Ox3, no focal signs.      Laurence " MD [facility]      SUBJECTIVE:   CC: Todd Verma is an 42 year old male who presents for preventative health visit.       Patient has been advised of split billing requirements and indicates understanding: Yes  Healthy Habits:     Getting at least 3 servings of Calcium per day:  Yes    Bi-annual eye exam:  Yes    Dental care twice a year:  Yes    Sleep apnea or symptoms of sleep apnea:  None    Diet:  Regular (no restrictions)    Frequency of exercise:  4-5 days/week    Duration of exercise:  45-60 minutes    Taking medications regularly:  Not Applicable    Medication side effects:  None    PHQ-2 Total Score: 0    Additional concerns today:  No              Today's PHQ-2 Score:   PHQ-2 ( 1999 Pfizer) 11/20/2020   Q1: Little interest or pleasure in doing things 0   Q2: Feeling down, depressed or hopeless 0   PHQ-2 Score 0   PHQ-2 Total Score (12-17 Years)- Positive if 3 or more points; Administer PHQ-A if positive 0   Q1: Little interest or pleasure in doing things Not at all   Q2: Feeling down, depressed or hopeless Not at all   PHQ-2 Score 0       Abuse: Current or Past(Physical, Sexual or Emotional)- No  Do you feel safe in your environment? Yes        Social History     Tobacco Use     Smoking status: Never Smoker     Smokeless tobacco: Never Used   Substance Use Topics     Alcohol use: Yes         Alcohol Use 11/20/2020   Prescreen: >3 drinks/day or >7 drinks/week? No   Prescreen: >3 drinks/day or >7 drinks/week? -       Last PSA: No results found for: PSA    Reviewed orders with patient. Reviewed health maintenance and updated orders accordingly - Yes  Labs reviewed in EPIC    Reviewed and updated as needed this visit by clinical staff                    Reviewed and updated as needed this visit by Provider                       Review of Systems   Constitutional: Negative for chills and fever.   HENT: Negative for congestion, ear pain, hearing loss and sore throat.    Eyes: Negative  "for pain and visual disturbance.   Respiratory: Negative for cough and shortness of breath.    Cardiovascular: Negative for chest pain, palpitations and peripheral edema.   Gastrointestinal: Negative for abdominal pain, constipation, diarrhea, heartburn, hematochezia and nausea.   Genitourinary: Negative for dysuria, frequency, genital sores, hematuria, impotence, penile discharge and urgency.   Musculoskeletal: Negative for arthralgias, joint swelling and myalgias.   Skin: Negative for rash.   Neurological: Negative for dizziness, weakness, headaches and paresthesias.   Psychiatric/Behavioral: Negative for mood changes. The patient is not nervous/anxious.            Patient has been advised of split billing requirements and indicates understanding: Yes At the check in, at the      COUNSELING:   Reviewed preventive health counseling, as reflected in patient instructions       Regular exercise       Healthy diet/nutrition    Estimated body mass index is 25.31 kg/m  as calculated from the following:    Height as of 11/20/20: 1.715 m (5' 7.5\").    Weight as of 11/20/20: 74.4 kg (164 lb).         He reports that he has never smoked. He has never used smokeless tobacco.      Counseling Resources:  ATP IV Guidelines  Pooled Cohorts Equation Calculator  FRAX Risk Assessment  ICSI Preventive Guidelines  Dietary Guidelines for Americans, 2010  USDA's MyPlate  ASA Prophylaxis  Lung CA Screening    Laurence Braswell MD  [facility]  "

## 2022-06-22 ENCOUNTER — OFFICE VISIT (OUTPATIENT)
Dept: INTERNAL MEDICINE | Facility: CLINIC | Age: 42
End: 2022-06-22
Payer: COMMERCIAL

## 2022-06-22 VITALS
OXYGEN SATURATION: 95 % | DIASTOLIC BLOOD PRESSURE: 60 MMHG | HEART RATE: 67 BPM | SYSTOLIC BLOOD PRESSURE: 100 MMHG | RESPIRATION RATE: 18 BRPM | HEIGHT: 68 IN | TEMPERATURE: 98.4 F | BODY MASS INDEX: 24.81 KG/M2 | WEIGHT: 163.7 LBS

## 2022-06-22 DIAGNOSIS — Z00.00 ROUTINE GENERAL MEDICAL EXAMINATION AT A HEALTH CARE FACILITY: Primary | ICD-10-CM

## 2022-06-22 DIAGNOSIS — H57.89 EYE INFLAMMATION: ICD-10-CM

## 2022-06-22 DIAGNOSIS — Z23 NEED FOR VACCINATION: ICD-10-CM

## 2022-06-22 PROCEDURE — 90715 TDAP VACCINE 7 YRS/> IM: CPT | Performed by: INTERNAL MEDICINE

## 2022-06-22 PROCEDURE — 99396 PREV VISIT EST AGE 40-64: CPT | Mod: 25 | Performed by: INTERNAL MEDICINE

## 2022-06-22 PROCEDURE — 90471 IMMUNIZATION ADMIN: CPT | Performed by: INTERNAL MEDICINE

## 2022-06-22 ASSESSMENT — ENCOUNTER SYMPTOMS
COUGH: 0
MYALGIAS: 0
CONSTIPATION: 0
PARESTHESIAS: 0
HEADACHES: 0
WEAKNESS: 0
DIARRHEA: 0
SORE THROAT: 0
HEARTBURN: 0
HEMATOCHEZIA: 0
ARTHRALGIAS: 0
DYSURIA: 0
EYE PAIN: 0
SHORTNESS OF BREATH: 0
JOINT SWELLING: 0
DIZZINESS: 0
ABDOMINAL PAIN: 0
PALPITATIONS: 0
FREQUENCY: 0
NAUSEA: 0
FEVER: 0
HEMATURIA: 0
NERVOUS/ANXIOUS: 0
CHILLS: 0

## 2022-07-01 ENCOUNTER — LAB (OUTPATIENT)
Dept: LAB | Facility: CLINIC | Age: 42
End: 2022-07-01
Payer: COMMERCIAL

## 2022-07-01 DIAGNOSIS — Z00.00 ROUTINE GENERAL MEDICAL EXAMINATION AT A HEALTH CARE FACILITY: ICD-10-CM

## 2022-07-01 DIAGNOSIS — H57.89 EYE INFLAMMATION: ICD-10-CM

## 2022-07-01 LAB
CRP SERPL-MCNC: <3 MG/L
ERYTHROCYTE [DISTWIDTH] IN BLOOD BY AUTOMATED COUNT: 11.8 % (ref 10–15)
ERYTHROCYTE [SEDIMENTATION RATE] IN BLOOD BY WESTERGREN METHOD: 6 MM/HR (ref 0–15)
HCT VFR BLD AUTO: 41.5 % (ref 40–53)
HGB BLD-MCNC: 14.8 G/DL (ref 13.3–17.7)
MCH RBC QN AUTO: 30.4 PG (ref 26.5–33)
MCHC RBC AUTO-ENTMCNC: 35.7 G/DL (ref 31.5–36.5)
MCV RBC AUTO: 85 FL (ref 78–100)
PLATELET # BLD AUTO: 209 10E3/UL (ref 150–450)
RBC # BLD AUTO: 4.87 10E6/UL (ref 4.4–5.9)
WBC # BLD AUTO: 5.5 10E3/UL (ref 4–11)

## 2022-07-01 PROCEDURE — 86140 C-REACTIVE PROTEIN: CPT

## 2022-07-01 PROCEDURE — 85027 COMPLETE CBC AUTOMATED: CPT

## 2022-07-01 PROCEDURE — 85652 RBC SED RATE AUTOMATED: CPT

## 2022-07-01 PROCEDURE — 84443 ASSAY THYROID STIM HORMONE: CPT

## 2022-07-01 PROCEDURE — 80061 LIPID PANEL: CPT

## 2022-07-01 PROCEDURE — 36415 COLL VENOUS BLD VENIPUNCTURE: CPT

## 2022-07-01 PROCEDURE — 80053 COMPREHEN METABOLIC PANEL: CPT

## 2022-07-02 LAB
ALBUMIN SERPL-MCNC: 4.2 G/DL (ref 3.4–5)
ALP SERPL-CCNC: 54 U/L (ref 40–150)
ALT SERPL W P-5'-P-CCNC: 26 U/L (ref 0–70)
ANION GAP SERPL CALCULATED.3IONS-SCNC: 6 MMOL/L (ref 3–14)
AST SERPL W P-5'-P-CCNC: 21 U/L (ref 0–45)
BILIRUB SERPL-MCNC: 0.5 MG/DL (ref 0.2–1.3)
BUN SERPL-MCNC: 15 MG/DL (ref 7–30)
CALCIUM SERPL-MCNC: 8.5 MG/DL (ref 8.5–10.1)
CHLORIDE BLD-SCNC: 104 MMOL/L (ref 94–109)
CHOLEST SERPL-MCNC: 206 MG/DL
CO2 SERPL-SCNC: 26 MMOL/L (ref 20–32)
CREAT SERPL-MCNC: 0.92 MG/DL (ref 0.66–1.25)
FASTING STATUS PATIENT QL REPORTED: YES
GFR SERPL CREATININE-BSD FRML MDRD: >90 ML/MIN/1.73M2
GLUCOSE BLD-MCNC: 90 MG/DL (ref 70–99)
HDLC SERPL-MCNC: 56 MG/DL
LDLC SERPL CALC-MCNC: 134 MG/DL
NONHDLC SERPL-MCNC: 150 MG/DL
POTASSIUM BLD-SCNC: 3.9 MMOL/L (ref 3.4–5.3)
PROT SERPL-MCNC: 7.1 G/DL (ref 6.8–8.8)
SODIUM SERPL-SCNC: 136 MMOL/L (ref 133–144)
TRIGL SERPL-MCNC: 82 MG/DL
TSH SERPL DL<=0.005 MIU/L-ACNC: 2.08 MU/L (ref 0.4–4)

## 2022-09-18 ENCOUNTER — HEALTH MAINTENANCE LETTER (OUTPATIENT)
Age: 42
End: 2022-09-18

## 2023-05-23 ENCOUNTER — PATIENT OUTREACH (OUTPATIENT)
Dept: CARE COORDINATION | Facility: CLINIC | Age: 43
End: 2023-05-23
Payer: COMMERCIAL

## 2023-06-07 ENCOUNTER — PATIENT OUTREACH (OUTPATIENT)
Dept: CARE COORDINATION | Facility: CLINIC | Age: 43
End: 2023-06-07
Payer: COMMERCIAL

## 2023-07-30 ENCOUNTER — HEALTH MAINTENANCE LETTER (OUTPATIENT)
Age: 43
End: 2023-07-30

## 2023-10-26 ENCOUNTER — MYC MEDICAL ADVICE (OUTPATIENT)
Dept: INTERNAL MEDICINE | Facility: CLINIC | Age: 43
End: 2023-10-26
Payer: COMMERCIAL

## 2024-09-12 ENCOUNTER — OFFICE VISIT (OUTPATIENT)
Dept: FAMILY MEDICINE | Facility: CLINIC | Age: 44
End: 2024-09-12
Payer: COMMERCIAL

## 2024-09-12 ENCOUNTER — ANCILLARY PROCEDURE (OUTPATIENT)
Dept: GENERAL RADIOLOGY | Facility: CLINIC | Age: 44
End: 2024-09-12
Payer: COMMERCIAL

## 2024-09-12 VITALS
SYSTOLIC BLOOD PRESSURE: 110 MMHG | HEIGHT: 67 IN | RESPIRATION RATE: 12 BRPM | OXYGEN SATURATION: 98 % | TEMPERATURE: 98.3 F | DIASTOLIC BLOOD PRESSURE: 72 MMHG | HEART RATE: 54 BPM | WEIGHT: 154.44 LBS | BODY MASS INDEX: 24.24 KG/M2

## 2024-09-12 DIAGNOSIS — M79.671 RIGHT FOOT PAIN: ICD-10-CM

## 2024-09-12 DIAGNOSIS — Z00.00 ROUTINE GENERAL MEDICAL EXAMINATION AT A HEALTH CARE FACILITY: Primary | ICD-10-CM

## 2024-09-12 DIAGNOSIS — R19.8 ABNORMAL BOWEL HABITS: ICD-10-CM

## 2024-09-12 DIAGNOSIS — Z30.09 ENCOUNTER FOR VASECTOMY COUNSELING: ICD-10-CM

## 2024-09-12 PROCEDURE — 73630 X-RAY EXAM OF FOOT: CPT | Mod: TC | Performed by: STUDENT IN AN ORGANIZED HEALTH CARE EDUCATION/TRAINING PROGRAM

## 2024-09-12 PROCEDURE — 99213 OFFICE O/P EST LOW 20 MIN: CPT | Mod: 25

## 2024-09-12 PROCEDURE — 99396 PREV VISIT EST AGE 40-64: CPT

## 2024-09-12 NOTE — ASSESSMENT & PLAN NOTE
Patient reports today that he had a short period of irregular bowel movements with some mild pain with defecation a few weeks ago.  He made the connection that the symptoms seem to correlate with an excessive consumption of Kombucha and has since stopped.  Once he stopped consuming these beverages, his bowel movements returned to baseline and he is no longer having the pain.  We discussed that Kombucha is fermented, has a lot of added sugar and is generally irritating to the gastrointestinal system if consumed in large quantities.  I do not think that we need to expand our workup at this time patient is in agreement with this; however if patient has return of symptoms or has new symptoms such as blood in the stool or unexpected weight loss we should proceed with additional diagnostics.

## 2024-09-12 NOTE — PROGRESS NOTES
Preventive Care Visit  [facility]  MadonnaGAY Rosenthal CNP, Family Medicine  Sep 12, 2024      Assessment & Plan   Problem List Items Addressed This Visit       Routine general medical examination at a health care facility - Primary     Annual exam.  PSA: Not indicated  Colonoscopy: Not indicated  Immunizations: Declines seasonal immunizations.  Labs: [place] and offered.  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  Patient has excellent diet and exercise habits.  STI screen: Declines.  Recommend follow up in one year for annual exam or sooner if needed/indicated elsewhere.         Right foot pain     Patient presents with complaints of plantar foot pain after a period of intense cardiovascular exercise.  He has since ceased running and has had improvement in the symptoms.  Discussed potential etiologies including stress fracture, overuse injury or fasciitis/tendinitis.  I think given the fact that he is active and would like to resume running, obtaining a plain film radiograph of the right foot is reasonable and this was completed today in clinic.  Final radiology read is pending but appears grossly normal.  A negative result, will treat as an overuse injury with attention to proper footwear, stretching prior to exercise and the use of anti-inflammatories and ice massage after activity.  Consider referral to sports medicine/podiatry if symptoms return and are not responding to conservative cares.         Relevant Orders    XR Foot Right G/E 3 Views    Abnormal bowel habits     Patient reports today that he had a short period of irregular bowel movements with some mild pain with defecation a few weeks ago.  He made the connection that the symptoms seem to correlate with an excessive consumption of Kombucha and has since stopped.  Once he stopped consuming these  beverages, his bowel movements returned to baseline and he is no longer having the pain.  We discussed that Kombucha is fermented, has a lot of added sugar and is generally irritating to the gastrointestinal system if consumed in large quantities.  I do not think that we need to expand our workup at this time patient is in agreement with this; however if patient has return of symptoms or has new symptoms such as blood in the stool or unexpected weight loss we should proceed with additional diagnostics.          Other Visit Diagnoses       Encounter for vasectomy counseling        Patient notes that he and his wife do not desire any additional childre. He is interested in a consultation for a vasectomy.  Urology referral placed today.    Relevant Orders    Adult Urology  Referral           Patient has been advised of split billing requirements and indicates understanding: Yes     Counseling  Appropriate preventive services were addressed with this patient via screening, questionnaire, or discussion as appropriate for fall prevention, nutrition, physical activity, Tobacco-use cessation, social engagement, weight loss and cognition.  Checklist reviewing preventive services available has been given to the patient.  Reviewed patient's diet, addressing concerns and/or questions.   He is at risk for lack of exercise and has been provided with information to increase physical activity for the benefit of his well-being.     Subjective   Ankush is a 44 year old, presenting for the following:  Physical (Pt. Nonfasting.), Consult (Vasectomy referral.), Foot Pain (RT. Sx for 2 months. Running a lot.), and Pain (With BM. Sx better now. Was drinking Kambucha and stop and sx better now.)        9/12/2024     9:40 AM   Additional Questions   Roomed by sac   Accompanied by self         9/12/2024     9:40 AM   Patient Reported Additional Medications   Patient reports taking the following new medications no        Health Care  Directive  Patient does not have a Health Care Directive or Living Will: Patient states has Advance Directive and will bring in a copy to clinic.    In addition, he is hoping to discuss the following:    Some abnormal bowel movements:  -Was having alterations between constipation and diarrhea. Overall not his normal patterns. Had some mild pain with defecation. He made the connection that his symptoms started when he began consuming large amount of Kombucha and he has since stopped with complete resolution of these symptoms.    Right foot pain:  -Running a little bit more than his baseline  -Feet have 'always been bothersome off and on'  -Sole of his right foot is tender.  -He did stop running for a week and symptoms improved  -Pain severity was a 5/10 when he was having it  -Aggravating factors include weight bearing and running as well as wearing shoes with poor support  -Alleviating factors include rest. He also had a massage done on the foot.     Desires a vasectomy and would like a referral.    Musculoskeletal Problem          9/12/2024   General Health   How would you rate your overall physical health? Excellent   Feel stress (tense, anxious, or unable to sleep) Not at all            9/12/2024   Nutrition   Three or more servings of calcium each day? Yes   Diet: Regular (no restrictions)   How many servings of fruit and vegetables per day? (!) 2-3   How many sweetened beverages each day? 0-1    Diet is well rounded. Lots of fresh fruits and vegetables ([employer]).        9/12/2024   Exercise   Days per week of moderate/strenous exercise 3 days      Gym membership. Weights and cardio. Crossfit.         9/12/2024   Social Factors   Frequency of gathering with friends or relatives Twice a week   Worry food won't last until get money to buy more No   Food not last or not have enough money for food? No   Do you have housing? (Housing is defined as stable permanent housing and does not include staying ouside  "in a car, in a tent, in an abandoned building, in an overnight shelter, or couch-surfing.) Yes   Are you worried about losing your housing? No   Lack of transportation? No   Unable to get utilities (heat,electricity)? No          9/12/2024   Dental   Dentist two times every year? Yes            9/12/2024   TB Screening   Were you born outside of the [place]? No        Today's PHQ-2 Score:       9/12/2024     9:31 AM   PHQ-2 ( 1999 Pfizer)   Q1: Little interest or pleasure in doing things 0   Q2: Feeling down, depressed or hopeless 0   PHQ-2 Score 0   Q1: Little interest or pleasure in doing things Not at all   Q2: Feeling down, depressed or hopeless Not at all   PHQ-2 Score 0           9/12/2024   Substance Use   Alcohol more than 3/day or more than 7/wk No   Do you use any other substances recreationally? No        Social History     Tobacco Use    Smoking status: Never    Smokeless tobacco: Never   Vaping Use    Vaping status: Never Used   Substance Use Topics    Alcohol use: Yes    Drug use: No         9/12/2024   One time HIV Screening   Previous HIV test? No          9/12/2024   STI Screening   New sexual partner(s) since last STI/HIV test? No      ASCVD Risk   The 10-year ASCVD risk score (Peng JONES, et al., 2019) is: 1%    Values used to calculate the score:      Age: 44 years      Sex: Male      Is Non- : No      Diabetic: No      Tobacco smoker: No      Systolic Blood Pressure: 110 mmHg      Is BP treated: No      HDL Cholesterol: 56 mg/dL      Total Cholesterol: 206 mg/dL        9/12/2024   Contraception/Family Planning   Questions about contraception or family planning No     Reviewed and updated as needed this visit by Provider                     Objective    Exam  /72 (BP Location: Left arm, Patient Position: Sitting, Cuff Size: Adult Regular)   Pulse 54   Temp 98.3  F (36.8  C) (Oral)   Resp 12   Ht 1.694 m (5' 6.7\")   Wt 70.1 kg (154 lb 7 oz)   SpO2 98%   BMI " "24.41 kg/m     Estimated body mass index is 24.41 kg/m  as calculated from the following:    Height as of this encounter: 1.694 m (5' 6.7\").    Weight as of this encounter: 70.1 kg (154 lb 7 oz).    Physical Exam  GENERAL: alert and no distress  EYES: Eyes grossly normal to inspection, PERRL and conjunctivae and sclerae normal  HENT: ear canals and TM's normal, nose and mouth without ulcers or lesions  NECK: no adenopathy, no asymmetry, masses, or scars  RESP: lungs clear to auscultation - no rales, rhonchi or wheezes  CV: regular rate and rhythm, normal S1 S2, no S3 or S4, no murmur, click or rub, no peripheral edema  ABDOMEN: soft, nontender, no hepatosplenomegaly, no masses and bowel sounds normal  MS: no gross musculoskeletal defects noted, no edema  SKIN: no suspicious lesions or rashes  NEURO: Normal strength and tone, mentation intact and speech normal  PSYCH: mentation appears normal, affect normal/bright    Diagnostics:  Right Foot X-ray ordered and interpreted in the office today. X-ray shows: No significant fracture or dislocation. Final radiology read is pending.      Signed Electronically by: GAY Kumari CNP    "

## 2024-09-12 NOTE — ASSESSMENT & PLAN NOTE
Patient presents with complaints of plantar foot pain after a period of intense cardiovascular exercise.  He has since ceased running and has had improvement in the symptoms.  Discussed potential etiologies including stress fracture, overuse injury or fasciitis/tendinitis.  I think given the fact that he is active and would like to resume running, obtaining a plain film radiograph of the right foot is reasonable and this was completed today in clinic.  Final radiology read is pending but appears grossly normal.  A negative result, will treat as an overuse injury with attention to proper footwear, stretching prior to exercise and the use of anti-inflammatories and ice massage after activity.  Consider referral to sports medicine/podiatry if symptoms return and are not responding to conservative cares.

## 2024-09-12 NOTE — PATIENT INSTRUCTIONS
Patient Education   Preventive Care Advice   This is general advice given by our system to help you stay healthy. However, your care team may have specific advice just for you. Please talk to your care team about your preventive care needs.  Nutrition  Eat 5 or more servings of fruits and vegetables each day.  Try wheat bread, brown rice and whole grain pasta (instead of white bread, rice, and pasta).  Get enough calcium and vitamin D. Check the label on foods and aim for 100% of the RDA (recommended daily allowance).  Lifestyle  Exercise at least 150 minutes each week  (30 minutes a day, 5 days a week).  Do muscle strengthening activities 2 days a week. These help control your weight and prevent disease.  No smoking.  Wear sunscreen to prevent skin cancer.  Have a dental exam and cleaning every 6 months.  Yearly exams  See your health care team every year to talk about:  Any changes in your health.  Any medicines your care team has prescribed.  Preventive care, family planning, and ways to prevent chronic diseases.  Shots (vaccines)   HPV shots (up to age 26), if you've never had them before.  Hepatitis B shots (up to age 59), if you've never had them before.  COVID-19 shot: Get this shot when it's due.  Flu shot: Get a flu shot every year.  Tetanus shot: Get a tetanus shot every 10 years.  Pneumococcal, hepatitis A, and RSV shots: Ask your care team if you need these based on your risk.  Shingles shot (for age 50 and up)  General health tests  Diabetes screening:  Starting at age 35, Get screened for diabetes at least every 3 years.  If you are younger than age 35, ask your care team if you should be screened for diabetes.  Cholesterol test: At age 39, start having a cholesterol test every 5 years, or more often if advised.  Bone density scan (DEXA): At age 50, ask your care team if you should have this scan for osteoporosis (brittle bones).  Hepatitis C: Get tested at least once in your life.  STIs (sexually  transmitted infections)  Before age 24: Ask your care team if you should be screened for STIs.  After age 24: Get screened for STIs if you're at risk. You are at risk for STIs (including HIV) if:  You are sexually active with more than one person.  You don't use condoms every time.  You or a partner was diagnosed with a sexually transmitted infection.  If you are at risk for HIV, ask about PrEP medicine to prevent HIV.  Get tested for HIV at least once in your life, whether you are at risk for HIV or not.  Cancer screening tests  Cervical cancer screening: If you have a cervix, begin getting regular cervical cancer screening tests starting at age 21.  Breast cancer scan (mammogram): If you've ever had breasts, begin having regular mammograms starting at age 40. This is a scan to check for breast cancer.  Colon cancer screening: It is important to start screening for colon cancer at age 45.  Have a colonoscopy test every 10 years (or more often if you're at risk) Or, ask your provider about stool tests like a FIT test every year or Cologuard test every 3 years.  To learn more about your testing options, visit:   .  For help making a decision, visit:   https://bit.ly/ri35206.  Prostate cancer screening test: If you have a prostate, ask your care team if a prostate cancer screening test (PSA) at age 55 is right for you.  Lung cancer screening: If you are a current or former smoker ages 50 to 80, ask your care team if ongoing lung cancer screenings are right for you.  For informational purposes only. Not to replace the advice of your health care provider. Copyright   2023 [employer]. All rights reserved. Clinically reviewed by the [facility]. [employer] [id number] - REV 01/24.

## 2024-09-12 NOTE — ASSESSMENT & PLAN NOTE
Annual exam.  PSA: Not indicated  Colonoscopy: Not indicated  Immunizations: Declines seasonal immunizations.  Labs: [place] and offered.  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  Patient has excellent diet and exercise habits.  STI screen: Declines.  Recommend follow up in one year for annual exam or sooner if needed/indicated elsewhere.

## 2024-12-18 ENCOUNTER — OFFICE VISIT (OUTPATIENT)
Dept: FAMILY MEDICINE | Facility: CLINIC | Age: 44
End: 2024-12-18
Payer: COMMERCIAL

## 2024-12-18 VITALS
HEIGHT: 67 IN | BODY MASS INDEX: 24.86 KG/M2 | DIASTOLIC BLOOD PRESSURE: 74 MMHG | SYSTOLIC BLOOD PRESSURE: 116 MMHG | TEMPERATURE: 98.1 F | HEART RATE: 57 BPM | WEIGHT: 158.4 LBS | OXYGEN SATURATION: 98 % | RESPIRATION RATE: 12 BRPM

## 2024-12-18 DIAGNOSIS — H93.8X1 PLUGGED FEELING IN EAR, RIGHT: Primary | ICD-10-CM

## 2024-12-18 DIAGNOSIS — J02.0 STREPTOCOCCAL PHARYNGITIS: ICD-10-CM

## 2024-12-18 LAB — DEPRECATED S PYO AG THROAT QL EIA: POSITIVE

## 2024-12-18 PROCEDURE — 87880 STREP A ASSAY W/OPTIC: CPT | Performed by: PHYSICIAN ASSISTANT

## 2024-12-18 PROCEDURE — 99213 OFFICE O/P EST LOW 20 MIN: CPT | Performed by: PHYSICIAN ASSISTANT

## 2024-12-18 RX ORDER — PENICILLIN V POTASSIUM 500 MG/1
500 TABLET, FILM COATED ORAL 3 TIMES DAILY
Qty: 30 TABLET | Refills: 0 | Status: SHIPPED | OUTPATIENT
Start: 2024-12-18 | End: 2024-12-28

## 2024-12-18 ASSESSMENT — PAIN SCALES - GENERAL: PAINLEVEL_OUTOF10: NO PAIN (0)

## 2024-12-18 NOTE — PROGRESS NOTES
"  Assessment & Plan   Problem List Items Addressed This Visit       Streptococcal pharyngitis - Primary     Presents today with symptoms of plugged right ear and sensation of fluid in his ear that began on Sunday.  He initially had a fever, sore throat.  Mild sinus drainage.  The fever has resolved.  His wife was recently diagnosed with strep throat.  We discussed that his ear exam today is completely normal.  This could be eustachian tube dysfunction.  I recommended use of Flonase nasal spray 2 sprays to nares daily.  Will proceed with rapid strep test checking with PCR at this time.    Strep returns and is positive.  Will treat with penicillin  mg 3 times daily for 10 days.  We discussed infection precautions.  Advised not to work tomorrow until he has been on antibiotics for 24 hours.  In addition I have recommended Flonase nasal spray 2 sprays to nares daily.         Relevant Medications    penicillin V (VEETID) 500 MG tablet       BMI  Estimated body mass index is 25.03 kg/m  as calculated from the following:    Height as of this encounter: 1.694 m (5' 6.7\").    Weight as of this encounter: 71.8 kg (158 lb 6.4 oz).             Subjective   Ankush is a 44 year old, presenting for the following health issues:  Ear Problem (Sinus issue- ear plugged. Feels swishy like fluid per patient)        12/18/2024     1:57 PM   Additional Questions   Roomed by Rebeca GONZALEZ   Accompanied by Self     History of Present Illness       Reason for visit:  Ear  Symptom onset:  1-3 days ago   He is taking medications regularly.             Objective    /74 (BP Location: Left arm, Patient Position: Sitting, Cuff Size: Adult Large)   Pulse 57   Temp 98.1  F (36.7  C) (Oral)   Resp 12   Ht 1.694 m (5' 6.7\")   Wt 71.8 kg (158 lb 6.4 oz)   SpO2 98%   BMI 25.03 kg/m    Body mass index is 25.03 kg/m .  Physical Exam  Vitals and nursing note reviewed.   Constitutional:       Appearance: Normal appearance.   HENT:      Head: " Normocephalic.      Right Ear: Tympanic membrane and ear canal normal.      Left Ear: Tympanic membrane and ear canal normal.      Nose:      Comments: No tenderness over the maxillary or frontal sinuses with palpation.     Mouth/Throat:      Mouth: Mucous membranes are moist.      Pharynx: Oropharynx is clear.   Eyes:      Conjunctiva/sclera: Conjunctivae normal.   Lymphadenopathy:      Cervical: No cervical adenopathy.   Neurological:      Mental Status: He is alert.                Signed Electronically by: Hoda Ellsworth PA-C

## 2024-12-18 NOTE — ASSESSMENT & PLAN NOTE
Presents today with symptoms of plugged right ear and sensation of fluid in his ear that began on Sunday.  He initially had a fever, sore throat.  Mild sinus drainage.  The fever has resolved.  His wife was recently diagnosed with strep throat.  We discussed that his ear exam today is completely normal.  This could be eustachian tube dysfunction.  I recommended use of Flonase nasal spray 2 sprays to nares daily.  Will proceed with rapid strep test checking with PCR at this time.    Strep returns and is positive.  Will treat with penicillin  mg 3 times daily for 10 days.  We discussed infection precautions.  Advised not to work tomorrow until he has been on antibiotics for 24 hours.  In addition I have recommended Flonase nasal spray 2 sprays to nares daily.